# Patient Record
Sex: MALE | HISPANIC OR LATINO | Employment: OTHER | RURAL
[De-identification: names, ages, dates, MRNs, and addresses within clinical notes are randomized per-mention and may not be internally consistent; named-entity substitution may affect disease eponyms.]

---

## 2022-11-21 ENCOUNTER — OFFICE VISIT (OUTPATIENT)
Dept: FAMILY MEDICINE CLINIC | Facility: CLINIC | Age: 32
End: 2022-11-21

## 2022-11-21 VITALS
HEART RATE: 87 BPM | OXYGEN SATURATION: 98 % | SYSTOLIC BLOOD PRESSURE: 116 MMHG | TEMPERATURE: 97.3 F | RESPIRATION RATE: 16 BRPM | DIASTOLIC BLOOD PRESSURE: 60 MMHG | BODY MASS INDEX: 36.71 KG/M2 | WEIGHT: 228.4 LBS | HEIGHT: 66 IN

## 2022-11-21 DIAGNOSIS — M25.531 RIGHT WRIST PAIN: ICD-10-CM

## 2022-11-21 DIAGNOSIS — E66.09 CLASS 2 OBESITY DUE TO EXCESS CALORIES WITHOUT SERIOUS COMORBIDITY WITH BODY MASS INDEX (BMI) OF 36.0 TO 36.9 IN ADULT: ICD-10-CM

## 2022-11-21 DIAGNOSIS — Z00.00 ANNUAL PHYSICAL EXAM: Primary | ICD-10-CM

## 2022-11-21 PROCEDURE — 99213 OFFICE O/P EST LOW 20 MIN: CPT | Performed by: STUDENT IN AN ORGANIZED HEALTH CARE EDUCATION/TRAINING PROGRAM

## 2022-11-21 PROCEDURE — 99385 PREV VISIT NEW AGE 18-39: CPT | Performed by: STUDENT IN AN ORGANIZED HEALTH CARE EDUCATION/TRAINING PROGRAM

## 2022-11-21 NOTE — PATIENT INSTRUCTIONS
- wrist brace off [employer]. Want metal or plastic stiff components so the wrist doesn't flex too much  - voltaren gel, ice the wrist after work  - focus on straight up and down loading rather than lifting and throwing weight to the side which can further stress the wrist  - let me know if you need a referral to a hand specialist

## 2022-11-21 NOTE — PROGRESS NOTES
Chief Complaint   Patient presents with   • Establish Care   • Annual Exam     Subjective   Noah Griffin is a 32 y.o. male here for his annual physical with me   Noah is here for coordination of medical care, to discuss health maintenance, disease prevention as well as to followup on medical problems.     Annual Physical Exam  Patient's last PE was unknown   Activity level is heavy.   Exercises 5 per week.   Appetite is good.   Feels fairly well with few complaints.   Energy level is good.   Sleeps poorly.   Patient's last colonoscopy was never.   Patient is doing routine self skin exam occasionally. Patient is doing routine testicle exams occasionally.    Right Wrist Pain  - has noticed some abnormal swollen areas in his wrist  - wrist only hurts with repetitive movement and twisting when he has a weight in his hand  -Denies numbness or tingling, denies pain at rest and denies coldness or rash of the hand    The following portions of the patient's history were reviewed and updated as appropriate: allergies, current medications, past family history, past medical history, past social history, past surgical history and problem list.      Past Medical History :  Active Ambulatory Problems     Diagnosis Date Noted   • Class 2 obesity due to excess calories without serious comorbidity with body mass index (BMI) of 36.0 to 36.9 in adult 11/21/2022     Resolved Ambulatory Problems     Diagnosis Date Noted   • No Resolved Ambulatory Problems     No Additional Past Medical History       Medication List:  No current outpatient medications on file.    Allergies:  No Known Allergies    Social History:  Social History     Socioeconomic History   • Marital status: Single   Tobacco Use   • Smoking status: Some Days     Types: Cigarettes     Start date: 2015   • Smokeless tobacco: Never   • Tobacco comments:     2 cigarettes a day. Sometimes will quit for a few months or for a few years then come back.   Vaping Use   • Vaping  "Use: Never used   Substance and Sexual Activity   • Alcohol use: Yes     Comment: drinks every 2 months, has about 5 beers per sitting   • Drug use: Never   • Sexual activity: Not Currently     Comment: currently single       Family History:  Family History   Problem Relation Age of Onset   • Seizures Mother    • Anxiety disorder Mother    • Hypertension Maternal Grandmother        Past Surgical History:  Past Surgical History:   Procedure Laterality Date   • WISDOM TOOTH EXTRACTION         PHQ-2 Depression Screening  Little interest or pleasure in doing things? 0-->not at all   Feeling down, depressed, or hopeless? 0-->not at all   PHQ-2 Total Score 0     Health Maintenance   Topic Date Due   • COVID-19 Vaccine (1) Never done   • ANNUAL PHYSICAL  Never done   • TDAP/TD VACCINES (1 - Tdap) Never done   • INFLUENZA VACCINE  Never done   • HEPATITIS C SCREENING  Never done   • Pneumococcal Vaccine 0-64  Aged Out         There is no immunization history on file for this patient.        Physical Exam:  Vital Signs:  /60 (BP Location: Right arm, Patient Position: Sitting, Cuff Size: Large Adult)   Pulse 87   Temp 97.3 °F (36.3 °C) (Skin)   Resp 16   Ht 167.6 cm (66\")   Wt 104 kg (228 lb 6.4 oz)   SpO2 98%   BMI 36.86 kg/m²     Physical Exam  Constitutional:       General: He is not in acute distress.     Appearance: Normal appearance.   HENT:      Head: Normocephalic and atraumatic.      Left Ear: Tympanic membrane normal.      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      Mouth/Throat:      Mouth: Mucous membranes are moist.      Pharynx: Oropharynx is clear. No posterior oropharyngeal erythema.   Eyes:      Extraocular Movements: Extraocular movements intact.      Conjunctiva/sclera: Conjunctivae normal.   Neck:      Comments: - Thyroid not enlarged  Cardiovascular:      Rate and Rhythm: Normal rate and regular rhythm.      Heart sounds: Normal heart " sounds.   Pulmonary:      Effort: Pulmonary effort is normal.      Breath sounds: Normal breath sounds. No stridor. No wheezing.   Abdominal:      General: Abdomen is flat. Bowel sounds are normal.      Palpations: Abdomen is soft. There is no mass.      Tenderness: There is no abdominal tenderness. There is no guarding.   Musculoskeletal:         General: No swelling or deformity. Normal range of motion.      Cervical back: Normal range of motion and neck supple.      Comments: -  strength equal between the hands  -Some swelling on the distal radial side of the wrist and on the radial side of the wrist on the palmar aspect.  Soft and reducible to palpation nontender  -No erythema of the wrist and no pain to palpation of the wrist bones   Skin:     General: Skin is warm and dry.      Capillary Refill: Capillary refill takes less than 2 seconds.      Coloration: Skin is not jaundiced.      Findings: No rash.   Neurological:      General: No focal deficit present.      Mental Status: He is alert and oriented to person, place, and time.      Cranial Nerves: No cranial nerve deficit.      Motor: No weakness.      Coordination: Coordination normal.      Gait: Gait normal.      Deep Tendon Reflexes: Reflexes normal.   Psychiatric:         Mood and Affect: Mood normal.         Behavior: Behavior normal.         Thought Content: Thought content normal.           Assessment and Plan:  Diagnoses and all orders for this visit:    1. Annual physical exam (Primary)  -Normal 32-year-old physical exam except for the abnormalities listed above    2. 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  Icing the wrist and using Voltaren  gel in the wrist  -Soft swellings of the wrist feel more like ganglion cysts.  Considering there is no overt swelling or pain of the wrist at rest, patient likely has not broken his wrist  -Discussed that we may need to send him to a hand specialist if the above does not work.  Patient verbalizes understanding and very willing to get this addressed so that he can continue to work  -Patient will let me know if he wants to be referred to a wrist specialist      3. Class 2 obesity due to excess calories without serious comorbidity with body mass index (BMI) of 36.0 to 36.9 in adult  Assessment & Plan:  Patient's (Body mass index is 36.86 kg/m².) indicates that they are obese (BMI >30) with health conditions that include none . Weight is unchanged. BMI is is above average; BMI management plan is completed. We discussed portion control and increasing exercise.           Discussed screening for common diseases.  Discussed timing of cervical cancer screening with pap smear and HPV testing. Encouraged self-breast exams, clinical breast exams, and discussed timing of mammograms.  Recommend yearly eye exams. Also discussed monitoring of blood pressure, lipids, blood sugars.      I wore protective equipment throughout this patient encounter to include mask and eye protection. Hand hygiene was performed before donning protective equipment and after removal when leaving the room.

## 2022-11-21 NOTE — ASSESSMENT & PLAN NOTE
Patient's (Body mass index is 36.86 kg/m².) indicates that they are obese (BMI >30) with health conditions that include none . Weight is unchanged. BMI is is above average; BMI management plan is completed. We discussed portion control and increasing exercise.

## 2023-11-27 ENCOUNTER — TELEPHONE (OUTPATIENT)
Dept: FAMILY MEDICINE CLINIC | Facility: CLINIC | Age: 33
End: 2023-11-27